# Patient Record
Sex: FEMALE | Race: WHITE | NOT HISPANIC OR LATINO | URBAN - NONMETROPOLITAN AREA
[De-identification: names, ages, dates, MRNs, and addresses within clinical notes are randomized per-mention and may not be internally consistent; named-entity substitution may affect disease eponyms.]

---

## 2023-04-25 ENCOUNTER — TELEPHONE (OUTPATIENT)
Dept: PRIMARY CARE | Facility: CLINIC | Age: 63
End: 2023-04-25
Payer: COMMERCIAL

## 2023-04-25 DIAGNOSIS — Z78.0 POSTMENOPAUSAL: ICD-10-CM

## 2023-04-25 DIAGNOSIS — Z12.31 ENCOUNTER FOR SCREENING MAMMOGRAM FOR MALIGNANT NEOPLASM OF BREAST: Primary | ICD-10-CM

## 2023-06-07 ENCOUNTER — TELEPHONE (OUTPATIENT)
Dept: PRIMARY CARE | Facility: CLINIC | Age: 63
End: 2023-06-07
Payer: COMMERCIAL

## 2023-06-07 DIAGNOSIS — N30.00 ACUTE CYSTITIS WITHOUT HEMATURIA: Primary | ICD-10-CM

## 2023-06-07 RX ORDER — CEPHALEXIN 500 MG/1
500 CAPSULE ORAL 3 TIMES DAILY
Qty: 21 CAPSULE | Refills: 0 | Status: SHIPPED | OUTPATIENT
Start: 2023-06-07 | End: 2023-06-14

## 2023-06-07 NOTE — TELEPHONE ENCOUNTER
Comprehensive Intake Entered On:  12/23/2020 11:36 AM CST    Performed On:  12/23/2020 11:34 AM CST by Geraldrenzo BRIONESAnkitaNanette               Summary   Chief Complaint :   Discuss meds- would like to go back on BCP and ADD meds- verbal consent given for phone visit   Nanette Duarte CMA - 12/23/2020 11:36 AM CST     Menstrual Status :   Menarcheal   Race :      Languages :   English   Ethnicity :   Not  or    Nanette Duarte CMA - 12/23/2020 11:34 AM CST   Health Status   Allergies Verified? :   Yes   Medication History Verified? :   Yes   Pre-Visit Planning Status :   Completed   Tobacco Use? :   Never smoker   Nanette Duarte CMA - 12/23/2020 11:34 AM CST   Consents   Consent for Immunization Exchange :   Consent Granted   Consent for Immunizations to Providers :   Consent Granted   Nanette Duarte CMA - 12/23/2020 11:34 AM CST   Meds / Allergies   (As Of: 12/23/2020 11:36:19 AM CST)   Allergies (Active)   sulfa drug  Estimated Onset Date:   Unspecified ; Comments:     Comment 1: Mom and grandma highly allergic to sulfa   ; Created By:   Berta León; Reaction Status:   Active ; Category:   Drug ; Substance:   sulfa drug ; Type:   Allergy ; Updated By:   Berta León; Reviewed Date:   8/16/2017 1:23 PM CDT        Medication List   (As Of: 12/23/2020 11:36:19 AM CST)   Prescription/Discharge Order    albuterol  :   albuterol ; Status:   Prescribed ; Ordered As Mnemonic:   ProAir HFA 90 mcg/inh inhalation aerosol ; Simple Display Line:   2 puff(s), inh, q4-6 hrs, 1 EA, 2 Refill(s) ; Ordering Provider:   Jackie Armijo MD; Catalog Code:   albuterol ; Order Dt/Tm:   9/21/2015 5:20:08 PM CDT          amphetamine-dextroamphetamine  :   amphetamine-dextroamphetamine ; Status:   Prescribed ; Ordered As Mnemonic:   Adderall XR 20 mg oral capsule, extended release ; Simple Display Line:   20 mg, 1 cap(s), PO, qAM, 30 cap(s), 0 Refill(s) ; Ordering Provider:   Katrin  UTI s/s starting a couple days ago asking if you can send something in.    April GRACE; Catalog Code:   amphetamine-dextroamphetamine ; Order Dt/Tm:   8/4/2017 5:38:18 PM CDT          ethinyl estradiol-levonorgestrel  :   ethinyl estradiol-levonorgestrel ; Status:   Processing ; Ordered As Mnemonic:   Quasense 0.15 mg-30 mcg oral tablet ; Ordering Provider:   April Fried; Action Display:   Complete ; Catalog Code:   ethinyl estradiol-levonorgestrel ; Order Dt/Tm:   12/23/2020 11:35:01 AM CST          Miscellaneous Prescription  :   Miscellaneous Prescription ; Status:   Prescribed ; Ordered As Mnemonic:   chamber spacer with mouthpiece  for use with albuterol ; Simple Display Line:   See Instructions, Use with albuterol., 1 EA ; Ordering Provider:   Macy Garcia MD; Catalog Code:   Miscellaneous Prescription ; Order Dt/Tm:   9/20/2012 9:48:17 AM CDT          naproxen  :   naproxen ; Status:   Prescribed ; Ordered As Mnemonic:   Naprosyn 500 mg oral tablet ; Simple Display Line:   500 mg, 1 tab(s), PO, BID, 60 tab(s), 0 Refill(s) ; Ordering Provider:   April Fried; Catalog Code:   naproxen ; Order Dt/Tm:   6/5/2017 6:10:10 PM CDT            ID Risk Screen   Recent Travel History :   No recent travel   Family Member Travel History :   No recent travel   Other Exposure to Infectious Disease :   Unknown   Nanette Duarte CMA - 12/23/2020 11:34 AM CST   Social History   Social History   (As Of: 12/23/2020 11:36:19 AM CST)   Alcohol:        Never   (Last Updated: 6/8/2015 4:25:16 PM CDT by Aniyah Walter MA)          Tobacco:        Never   (Last Updated: 11/20/2013 6:15:00 PM CST by Ev Odom LPN)   Never (less than 100 in lifetime)   (Last Updated: 12/23/2020 11:36:07 AM CST by Nanette Duarte CMA)          Electronic Cigarette/Vaping:        Electronic Cigarette Use: Never.   (Last Updated: 12/23/2020 11:36:11 AM CST by Nanette Duarte CMA)          Substance Abuse:        Never   (Last Updated: 6/8/2015 4:25:16 PM CDT by Aniyah Walter MA)           Employment/School:        Student   (Last Updated: 6/8/2015 4:25:16 PM CDT by Running Aniyah PARSONS)          Home/Environment:        Marital status: Single.   (Last Updated: 6/8/2015 4:25:17 PM CDT by Running Aniyah PARSONS)          Nutrition/Health:        Type of diet: Regular.   (Last Updated: 6/8/2015 4:25:17 PM CDT by Aniyah Walter MA)          Exercise:        Exercise frequency: 1-2 times/week.  Exercise type: Running.   (Last Updated: 6/8/2015 4:25:17 PM CDT by Aniyah Walter MA)          Sexual:        Sexually active: Yes.  Sexual orientation: Heterosexual.  Contraceptive Use Details: Birth control pill, Condoms.   (Last Updated: 6/8/2015 4:25:17 PM CDT by Aniyah Walter MA)

## 2023-06-08 NOTE — TELEPHONE ENCOUNTER
Called patient and sent prescription for Keflex.  Advised to call 911 or to go to the emergency room as soon as possible if develops abdominal pain/flank pain, pain is getting worse, fever, chills, nausea and vomiting.  She understood verbalized understanding.

## 2023-06-30 ENCOUNTER — APPOINTMENT (OUTPATIENT)
Dept: PRIMARY CARE | Facility: CLINIC | Age: 63
End: 2023-06-30
Payer: COMMERCIAL

## 2023-07-06 ENCOUNTER — OFFICE VISIT (OUTPATIENT)
Dept: PRIMARY CARE | Facility: CLINIC | Age: 63
End: 2023-07-06
Payer: MEDICARE

## 2023-07-06 VITALS
HEART RATE: 79 BPM | TEMPERATURE: 96.7 F | DIASTOLIC BLOOD PRESSURE: 64 MMHG | WEIGHT: 127.6 LBS | BODY MASS INDEX: 20.51 KG/M2 | HEIGHT: 66 IN | SYSTOLIC BLOOD PRESSURE: 100 MMHG | OXYGEN SATURATION: 98 %

## 2023-07-06 DIAGNOSIS — Z00.00 WELCOME TO MEDICARE PREVENTIVE VISIT: ICD-10-CM

## 2023-07-06 DIAGNOSIS — Z00.00 ROUTINE GENERAL MEDICAL EXAMINATION AT HEALTH CARE FACILITY: Primary | ICD-10-CM

## 2023-07-06 DIAGNOSIS — K64.8 OTHER HEMORRHOIDS: ICD-10-CM

## 2023-07-06 DIAGNOSIS — Z13.6 SCREENING FOR CARDIOVASCULAR CONDITION: ICD-10-CM

## 2023-07-06 DIAGNOSIS — N39.0 RECURRENT UTI: ICD-10-CM

## 2023-07-06 DIAGNOSIS — Z11.59 NEED FOR HEPATITIS C SCREENING TEST: ICD-10-CM

## 2023-07-06 DIAGNOSIS — Z86.14 HISTORY OF MRSA INFECTION: ICD-10-CM

## 2023-07-06 PROCEDURE — G0402 INITIAL PREVENTIVE EXAM: HCPCS | Performed by: INTERNAL MEDICINE

## 2023-07-06 PROCEDURE — 1036F TOBACCO NON-USER: CPT | Performed by: INTERNAL MEDICINE

## 2023-07-06 RX ORDER — MULTIVITAMIN
1 TABLET ORAL DAILY
COMMUNITY

## 2023-07-06 RX ORDER — BUTYROSPERMUM PARKII(SHEA BUTTER), SIMMONDSIA CHINENSIS (JOJOBA) SEED OIL, ALOE BARBADENSIS LEAF EXTRACT .01; 1; 3.5 G/100G; G/100G; G/100G
250 LIQUID TOPICAL 2 TIMES DAILY
COMMUNITY

## 2023-07-06 RX ORDER — PNEUMOCOCCAL 20-VALENT CONJUGATE VACCINE 2.2; 2.2; 2.2; 2.2; 2.2; 2.2; 2.2; 2.2; 2.2; 2.2; 2.2; 2.2; 2.2; 2.2; 2.2; 2.2; 4.4; 2.2; 2.2; 2.2 UG/.5ML; UG/.5ML; UG/.5ML; UG/.5ML; UG/.5ML; UG/.5ML; UG/.5ML; UG/.5ML; UG/.5ML; UG/.5ML; UG/.5ML; UG/.5ML; UG/.5ML; UG/.5ML; UG/.5ML; UG/.5ML; UG/.5ML; UG/.5ML; UG/.5ML; UG/.5ML
0.5 INJECTION, SUSPENSION INTRAMUSCULAR ONCE
Qty: 0.5 ML | Refills: 0 | Status: SHIPPED | OUTPATIENT
Start: 2023-07-06 | End: 2023-07-06

## 2023-07-06 ASSESSMENT — ENCOUNTER SYMPTOMS
UNEXPECTED WEIGHT CHANGE: 0
COUGH: 0
SINUS PAIN: 0
BRUISES/BLEEDS EASILY: 0
OCCASIONAL FEELINGS OF UNSTEADINESS: 0
ABDOMINAL PAIN: 0
DEPRESSION: 0
WHEEZING: 0
DIFFICULTY URINATING: 0
ARTHRALGIAS: 0
LOSS OF SENSATION IN FEET: 0
FATIGUE: 0
PALPITATIONS: 0
BLOOD IN STOOL: 0
ANAL BLEEDING: 1
HEADACHES: 0
FEVER: 0
SORE THROAT: 0
DIZZINESS: 0
DIARRHEA: 0

## 2023-07-06 ASSESSMENT — PATIENT HEALTH QUESTIONNAIRE - PHQ9
2. FEELING DOWN, DEPRESSED OR HOPELESS: NOT AT ALL
2. FEELING DOWN, DEPRESSED OR HOPELESS: NOT AT ALL
SUM OF ALL RESPONSES TO PHQ9 QUESTIONS 1 AND 2: 0
1. LITTLE INTEREST OR PLEASURE IN DOING THINGS: NOT AT ALL
1. LITTLE INTEREST OR PLEASURE IN DOING THINGS: NOT AT ALL
SUM OF ALL RESPONSES TO PHQ9 QUESTIONS 1 AND 2: 0

## 2023-07-06 ASSESSMENT — VISUAL ACUITY
OD_CC: 20/30
OS_CC: 20/30

## 2023-07-06 ASSESSMENT — ACTIVITIES OF DAILY LIVING (ADL)
DRESSING: INDEPENDENT
BATHING: INDEPENDENT

## 2023-07-06 NOTE — PROGRESS NOTES
Subjective   Reason for Visit: Isadora Lei is an 62 y.o. female here for a Medicare Wellness visit.     Past Medical, Surgical, and Family History reviewed and updated in chart.    Reviewed all medications by prescribing practitioner or clinical pharmacist (such as prescriptions, OTCs, herbal therapies and supplements) and documented in the medical record.    New patient comes today to establish new primary care physician also wants to have welcome to Medicare exam  Annual preventive and welcome to Medicare exam  - EKG sinus rhythm patient reassured maximize medical management  -Vaccinations reviewed and up-to-date  Needs booster dose of Prevnar 20 new prescription provided today  -Needs a screening for hepatitis C  -Screening for breast cancer mammogram obtained in April 2023 negative  - Screening for osteoporosis bone densitometry April 2023 negative continue with calcium and vitamin D  - Screening for colon cancer Cologuard negative repeat in April 2026  -Recent blood work from previous PCP reviewed up-to-date within normal limits    - History of stage III hemorrhoids not improving with the local treatment patient need to schedule surgical appointment for further eval and treatment  -Last GYN exam and Pap smear many years ago need to follow-up GYN for Pap smear  -History of left knee MRSA skin infection resolved with no recurrence  - Underlying history of recurrent UTI patient counseled about prevention urine analysis and culture if needed  -Follow-up results closely otherwise we will reevaluate patient in 6 months        Patient Care Team:  Ann Marie Li MD as PCP - General (Internal Medicine)  Esa Fernando MD as PCP - [facility] PCP     Review of Systems   Constitutional:  Negative for fatigue, fever and unexpected weight change.   HENT:  Negative for congestion, ear discharge, ear pain, mouth sores, sinus pain and sore throat.    Eyes:  Negative for visual disturbance.   Respiratory:   "Negative for cough and wheezing.    Cardiovascular:  Negative for chest pain, palpitations and leg swelling.   Gastrointestinal:  Positive for anal bleeding. Negative for abdominal pain, blood in stool and diarrhea.   Genitourinary:  Negative for difficulty urinating.   Musculoskeletal:  Negative for arthralgias.   Skin:  Negative for rash.   Neurological:  Negative for dizziness and headaches.   Hematological:  Does not bruise/bleed easily.   Psychiatric/Behavioral:  Negative for behavioral problems.    All other systems reviewed and are negative.      Objective   Vitals:  /64   Pulse 79   Temp 35.9 °C (96.7 °F)   Ht 1.683 m (5' 6.25\")   Wt 57.9 kg (127 lb 9.6 oz)   SpO2 98%   BMI 20.44 kg/m²       Physical Exam  Vitals and nursing note reviewed.   Constitutional:       Appearance: Normal appearance.   HENT:      Head: Normocephalic.      Right Ear: There is no impacted cerumen.      Left Ear: There is no impacted cerumen.      Nose: Nose normal.   Eyes:      Conjunctiva/sclera: Conjunctivae normal.      Pupils: Pupils are equal, round, and reactive to light.   Neck:      Vascular: No carotid bruit.   Cardiovascular:      Rate and Rhythm: Regular rhythm.   Pulmonary:      Effort: Pulmonary effort is normal.      Breath sounds: Normal breath sounds.   Abdominal:      General: Abdomen is flat.      Palpations: Abdomen is soft.   Musculoskeletal:      Cervical back: Neck supple. No rigidity or tenderness.   Lymphadenopathy:      Cervical: No cervical adenopathy.   Skin:     General: Skin is warm.   Neurological:      General: No focal deficit present.      Mental Status: She is oriented to person, place, and time.   Psychiatric:         Mood and Affect: Mood normal.         Assessment/Plan   Problem List Items Addressed This Visit       Other hemorrhoids    Relevant Orders    CBC and Auto Differential    Urinalysis with Reflex Microscopic and Culture    Recurrent UTI    Relevant Orders    CBC and Auto " Differential    Urinalysis with Reflex Microscopic and Culture    History of MRSA infection     Other Visit Diagnoses       Routine general medical examination at health care facility    -  Primary    Welcome to Medicare preventive visit        Relevant Medications    pneumoc 20-nalini conj-dip cr,PF, (Prevnar 20, PF,) 0.5 mL vaccine    Other Relevant Orders    CBC and Auto Differential    Comprehensive Metabolic Panel    Lipid Panel    TSH with reflex to Free T4 if abnormal    Screening for cardiovascular condition        Relevant Orders    ECG 12 lead (Completed)    Need for hepatitis C screening test        Relevant Orders    Hepatitis C antibody        New patient comes today to establish new primary care physician also wants to have welcome to Medicare exam  Annual preventive and welcome to Medicare exam  - EKG sinus rhythm patient reassured maximize medical management  -Vaccinations reviewed and up-to-date  Needs booster dose of Prevnar 20 new prescription provided today  -Needs a screening for hepatitis C  -Screening for breast cancer mammogram obtained in April 2023 negative  - Screening for osteoporosis bone densitometry April 2023 negative continue with calcium and vitamin D  - Screening for colon cancer Cologuard negative repeat in April 2026  -Recent blood work from previous PCP reviewed up-to-date within normal limits    - History of stage III hemorrhoids not improving with the local treatment patient need to schedule surgical appointment for further eval and treatment  -Last GYN exam and Pap smear many years ago need to follow-up GYN for Pap smear  -History of left knee MRSA skin infection resolved with no recurrence  - Underlying history of recurrent UTI patient counseled about prevention urine analysis and culture if needed  -Follow-up results closely otherwise we will reevaluate patient in 6 months

## 2023-10-19 PROBLEM — R21 RASH: Status: ACTIVE | Noted: 2023-10-19

## 2023-10-19 PROBLEM — N94.9 VAGINAL BURNING: Status: ACTIVE | Noted: 2023-10-19

## 2023-10-19 PROBLEM — B35.1 ONYCHOMYCOSIS OF TOENAIL: Status: ACTIVE | Noted: 2023-10-19

## 2023-10-19 PROBLEM — M25.551 HIP PAIN, RIGHT: Status: ACTIVE | Noted: 2023-10-19

## 2023-10-19 PROBLEM — L98.9 CHANGING SKIN LESION: Status: ACTIVE | Noted: 2023-10-19

## 2023-10-19 PROBLEM — K64.2 GRADE III HEMORRHOIDS: Status: ACTIVE | Noted: 2023-10-19

## 2023-10-19 PROBLEM — K62.89 RECTAL PAIN, CHRONIC: Status: ACTIVE | Noted: 2023-10-19

## 2023-10-19 PROBLEM — N95.2 ATROPHIC VAGINITIS: Status: ACTIVE | Noted: 2023-10-19

## 2023-10-19 PROBLEM — R53.83 FATIGUE: Status: ACTIVE | Noted: 2023-10-19

## 2023-10-19 PROBLEM — G89.29 RECTAL PAIN, CHRONIC: Status: ACTIVE | Noted: 2023-10-19

## 2023-10-19 PROBLEM — K64.8 INTERNAL HEMORRHOID: Status: ACTIVE | Noted: 2023-10-19

## 2023-10-19 PROBLEM — M51.369 DDD (DEGENERATIVE DISC DISEASE), LUMBAR: Status: ACTIVE | Noted: 2023-10-19

## 2023-10-19 PROBLEM — R23.8 CHANGE OF SKIN COLOR: Status: ACTIVE | Noted: 2023-10-19

## 2023-10-19 PROBLEM — N94.89 VAGINAL BURNING: Status: ACTIVE | Noted: 2023-10-19

## 2023-10-19 PROBLEM — K60.2 ANAL FISSURE: Status: ACTIVE | Noted: 2023-10-19

## 2023-10-19 PROBLEM — M51.36 DDD (DEGENERATIVE DISC DISEASE), LUMBAR: Status: ACTIVE | Noted: 2023-10-19

## 2023-10-19 PROBLEM — I10 BENIGN ESSENTIAL HTN: Status: ACTIVE | Noted: 2023-10-19

## 2023-10-19 RX ORDER — CLOTRIMAZOLE 1 %
1 CREAM (GRAM) TOPICAL 2 TIMES DAILY
COMMUNITY
End: 2023-10-20 | Stop reason: WASHOUT

## 2023-10-19 RX ORDER — MONTELUKAST SODIUM 4 MG/1
1 TABLET, CHEWABLE ORAL 2 TIMES DAILY
COMMUNITY
Start: 2023-08-28 | End: 2023-11-06 | Stop reason: SDUPTHER

## 2023-10-19 RX ORDER — NYSTATIN 100000 U/G
1 OINTMENT TOPICAL 2 TIMES DAILY
COMMUNITY
Start: 2022-11-18

## 2023-10-19 RX ORDER — ESTRADIOL 4 UG/1
INSERT VAGINAL
COMMUNITY
Start: 2019-03-06

## 2023-10-20 ENCOUNTER — OFFICE VISIT (OUTPATIENT)
Dept: SURGERY | Facility: CLINIC | Age: 63
End: 2023-10-20
Payer: MEDICARE

## 2023-10-20 VITALS
SYSTOLIC BLOOD PRESSURE: 114 MMHG | RESPIRATION RATE: 17 BRPM | DIASTOLIC BLOOD PRESSURE: 71 MMHG | HEIGHT: 66 IN | WEIGHT: 128.2 LBS | HEART RATE: 74 BPM | BODY MASS INDEX: 20.6 KG/M2

## 2023-10-20 DIAGNOSIS — L29.0 PRURITUS ANI: ICD-10-CM

## 2023-10-20 DIAGNOSIS — M62.838 LEVATOR SPASM: Primary | ICD-10-CM

## 2023-10-20 DIAGNOSIS — K64.1 GRADE II INTERNAL HEMORRHOIDS: ICD-10-CM

## 2023-10-20 PROCEDURE — 99204 OFFICE O/P NEW MOD 45 MIN: CPT | Performed by: SURGERY

## 2023-10-20 PROCEDURE — 3078F DIAST BP <80 MM HG: CPT | Performed by: SURGERY

## 2023-10-20 PROCEDURE — 1159F MED LIST DOCD IN RCRD: CPT | Performed by: SURGERY

## 2023-10-20 PROCEDURE — 46600 DIAGNOSTIC ANOSCOPY SPX: CPT | Performed by: SURGERY

## 2023-10-20 PROCEDURE — 1160F RVW MEDS BY RX/DR IN RCRD: CPT | Performed by: SURGERY

## 2023-10-20 PROCEDURE — 1036F TOBACCO NON-USER: CPT | Performed by: SURGERY

## 2023-10-20 PROCEDURE — 3074F SYST BP LT 130 MM HG: CPT | Performed by: SURGERY

## 2023-10-20 PROCEDURE — 99214 OFFICE O/P EST MOD 30 MIN: CPT | Mod: 25 | Performed by: SURGERY

## 2023-10-20 PROCEDURE — 1125F AMNT PAIN NOTED PAIN PRSNT: CPT | Performed by: SURGERY

## 2023-10-20 ASSESSMENT — LIFESTYLE VARIABLES
HOW OFTEN DO YOU HAVE A DRINK CONTAINING ALCOHOL: NEVER
AUDIT-C TOTAL SCORE: 0
HOW MANY STANDARD DRINKS CONTAINING ALCOHOL DO YOU HAVE ON A TYPICAL DAY: PATIENT DOES NOT DRINK
HOW OFTEN DO YOU HAVE SIX OR MORE DRINKS ON ONE OCCASION: NEVER
SKIP TO QUESTIONS 9-10: 1

## 2023-10-20 ASSESSMENT — PATIENT HEALTH QUESTIONNAIRE - PHQ9
SUM OF ALL RESPONSES TO PHQ9 QUESTIONS 1 & 2: 0
2. FEELING DOWN, DEPRESSED OR HOPELESS: NOT AT ALL
1. LITTLE INTEREST OR PLEASURE IN DOING THINGS: NOT AT ALL

## 2023-10-20 ASSESSMENT — ENCOUNTER SYMPTOMS
LOSS OF SENSATION IN FEET: 0
DEPRESSION: 0
OCCASIONAL FEELINGS OF UNSTEADINESS: 0

## 2023-10-20 ASSESSMENT — PAIN SCALES - GENERAL: PAINLEVEL: 4

## 2023-10-20 NOTE — PROGRESS NOTES
[facility]: GENERAL SURGERY  PROGRESS NOTE      Isadora Lei is a 63 y.o. female that is presenting today for New Patient Visit (Dr. Ureña ref for hemorrhoids).    ASSESSMENT / PLAN:  Diagnoses and all orders for this visit:  Levator spasm  Grade II internal hemorrhoids  Pruritus ani  I think banding would not currently be beneficial as the patient's symptoms do not appear to be solely hemorrhoidal.  Discussed Kegel exercises and stretching for levator spasm as first-line, may benefit from referral to pelvic floor physiotherapy should symptoms persist.  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  May benefit from GI referral for colonoscopy with random biopsies/SIBO testing/food allergy testing should symptoms persist    Patient Active Problem List   Diagnosis    Other hemorrhoids    Recurrent UTI    History of MRSA infection    Anal fissure    Atrophic vaginitis    Benign essential HTN    Change of skin color    Changing skin lesion    DDD (degenerative disc disease), lumbar    Fatigue    Grade III hemorrhoids    Hip pain, right    Onychomycosis of toenail    Rash    Rectal pain, chronic    Vaginal burning    Internal hemorrhoid     Subjective   12 yrs of symptoms, pain and fullness not only with bowel movements.  Occasional perianal burning.  She uses Calmoseptine which occasionally improves her symptoms but can also burn.  No sharp pain with defecation.  BM only 4 per day with colestipol but have been up to 12 times per day.  Believes she gets adequate fiber in her diet and is not on a current fiber supplement. c scope within 10 years but does not believe she had random biopsies symptoms improved with walking, has had 6 sessions of IRC reported internal hemorrhoids without any improvement in symptoms. no bleeding with bowel movements, no fecal incontinence  Review of  Systems   All other systems reviewed and are negative.     Objective   Vitals:    10/20/23 1047   BP: 114/71   Pulse: 74   Resp: 17      Physical Exam  Constitutional:       Appearance: Normal appearance.   HENT:      Head: Normocephalic.   Cardiovascular:      Rate and Rhythm: Normal rate and regular rhythm.      Pulses: Normal pulses.   Pulmonary:      Effort: Pulmonary effort is normal.   Abdominal:      General: Bowel sounds are normal.      Palpations: Abdomen is soft.   Genitourinary:     Comments: The patient was placed in prone Kraske position. Perianal skin was examined without abnormality.  No excoriation or fissure seen.  No external hemorrhoids identified.  Digital rectal exam revealed diminished tone with present but slightly poor squeeze.  No palpable masses appreciated.  There is acute tenderness palpation of the right levator muscle anoscope was inserted with 2 internal hemorrhoids in the 3 cardinal locations  Musculoskeletal:         General: Normal range of motion.   Skin:     General: Skin is warm.   Neurological:      General: No focal deficit present.      Mental Status: She is alert.   Psychiatric:         Mood and Affect: Mood normal.         Behavior: Behavior normal.         Diagnostic Results   Lab Results   Component Value Date    GLUCOSE 83 11/03/2022    CALCIUM 9.0 11/03/2022     11/03/2022    K 3.9 11/03/2022    CO2 30 11/03/2022     11/03/2022    BUN 19 11/03/2022    CREATININE 0.82 11/03/2022     Lab Results   Component Value Date    ALT 15 11/03/2022    AST 21 11/03/2022    ALKPHOS 61 11/03/2022    BILITOT 1.2 11/03/2022     Lab Results   Component Value Date    WBC 7.2 11/03/2022    HGB 13.8 11/03/2022    HCT 41.3 11/03/2022    MCV 96 11/03/2022     11/03/2022     Lab Results   Component Value Date    CHOL 163 11/03/2022    CHOL 157 11/11/2021    CHOL 174 11/11/2020     Lab Results   Component Value Date    HDL 53.7 11/03/2022    HDL 54.4 11/11/2021    HDL 57.3  "11/11/2020     No results found for: \"LDLCALC\"  Lab Results   Component Value Date    TRIG 122 11/03/2022    TRIG 92 11/11/2021    TRIG 106 11/11/2020     No components found for: \"CHOLHDL\"  No results found for: \"HGBA1C\"  Other labs not included in the list above were reviewed either before or during this encounter.    History    Past Medical History:   Diagnosis Date    Encounter for general adult medical examination without abnormal findings     Encounter for preventive health examination    Fissure, anal     PONV (postoperative nausea and vomiting)      Past Surgical History:   Procedure Laterality Date    ADENOIDECTOMY      CHOLECYSTECTOMY      OTHER SURGICAL HISTORY  07/02/2020    Tonsillectomy    OTHER SURGICAL HISTORY  07/02/2020    Gallbladder surgery    RHINOPLASTY      VEIN LIGATION AND STRIPPING       Family History   Problem Relation Name Age of Onset    Heart disease Mother Doris     Other (cardiac disease) Mother Doris     Arthritis Mother Doris     COPD Mother Doris     Hearing loss Mother Doris     Hypertension Mother Doris     Miscarriages / Stillbirths Mother Doris     Stroke Mother Doris     Vision loss Mother Doris     Diabetes Father Regan     Heart disease Father Regan     Skin cancer Father Regan     Kidney failure Father Regan     Arthritis Father Rgean     Asthma Father Regan     Cancer Father Regan     Hearing loss Father Regan     Hyperlipidemia Father Regan     Hypertension Father Regan     Kidney disease Father Regan     Stroke Father Regan     Vision loss Father Regan      Allergies   Allergen Reactions    Fluconazole Swelling     Swelling of the tongue     Current Outpatient Medications on File Prior to Visit   Medication Sig Dispense Refill    colestipol (Colestid) 1 gram tablet Take 1 tablet (1 g) by mouth 2 times a day.      multivitamin tablet Take 1 tablet by mouth once daily.      nystatin (Mycostatin) ointment Apply 1 Application topically 2 times a day.  APPLY SPARINGLY TO AFFECTED AREA(S) 2 TIMES DAILY.      " saccharomyces boulardii (Florastor) 250 mg capsule Take 1 capsule (250 mg) by mouth 2 times a day.      clotrimazole (Lotrimin AF, clotrimazole,) 1 % cream Apply 1 Application topically 2 times a day.      estradioL (Imvexxy Maintenance Pack) 4 mcg insert 1 _insert Vaginal Two times a Week--maintanence pack       No current facility-administered medications on file prior to visit.     Immunization History   Administered Date(s) Administered    Flu vaccine (IIV4), preservative free *Check age/dose* 10/21/2017, 10/14/2018, 09/28/2019, 10/10/2020, 10/15/2021, 10/14/2022    Influenza, Seasonal, Quadrivalent, Adjuvanted 08/06/2021, 08/16/2021    Influenza, Unspecified 09/27/2015, 10/22/2017, 09/06/2018    Moderna COVID-19 vaccine, bivalent, blue cap/gray label *Check age/dose* 10/17/2022    Novel influenza-H1N1-09, preservative-free 12/14/2009    Pfizer COVID-19 vaccine, Fall 2023, 12 years and older, (30mcg/0.3mL) 10/08/2023    Pfizer Gray Cap SARS-CoV-2 04/13/2022    Pfizer Purple Cap SARS-CoV-2 03/11/2021, 04/01/2021, 11/15/2021, 09/12/2023    Pneumococcal conjugate vaccine, 20-valent (PREVNAR 20) 07/07/2023    RSV, Bivalent, 0.5 mL (ABRYSVO) 07/24/2023, 10/08/2023    Td (adult), unspecified 08/08/2003    Tdap vaccine, age 7 year and older (BOOSTRIX) 10/31/2022    Zoster vaccine, recombinant, adult (SHINGRIX) 09/06/2018, 11/08/2018     Patient's medical history was reviewed and updated either before or during this encounter.    Mazin Judge MD

## 2023-11-05 ENCOUNTER — PATIENT MESSAGE (OUTPATIENT)
Dept: GASTROENTEROLOGY | Facility: CLINIC | Age: 63
End: 2023-11-05
Payer: MEDICARE

## 2023-11-05 DIAGNOSIS — R19.7 DIARRHEA, UNSPECIFIED TYPE: Primary | ICD-10-CM

## 2023-11-06 RX ORDER — MONTELUKAST SODIUM 4 MG/1
1 TABLET, CHEWABLE ORAL 2 TIMES DAILY
Qty: 180 TABLET | Refills: 1 | Status: SHIPPED | OUTPATIENT
Start: 2023-11-06 | End: 2024-05-04

## 2024-05-17 ENCOUNTER — LAB (OUTPATIENT)
Dept: LAB | Facility: LAB | Age: 64
End: 2024-05-17
Payer: MEDICARE

## 2024-05-17 DIAGNOSIS — K64.8 OTHER HEMORRHOIDS: ICD-10-CM

## 2024-05-17 DIAGNOSIS — Z11.59 NEED FOR HEPATITIS C SCREENING TEST: ICD-10-CM

## 2024-05-17 DIAGNOSIS — N39.0 RECURRENT UTI: ICD-10-CM

## 2024-05-17 DIAGNOSIS — Z00.00 WELCOME TO MEDICARE PREVENTIVE VISIT: ICD-10-CM

## 2024-05-17 LAB
ALBUMIN SERPL BCP-MCNC: 4.2 G/DL (ref 3.4–5)
ALP SERPL-CCNC: 63 U/L (ref 33–136)
ALT SERPL W P-5'-P-CCNC: 13 U/L (ref 7–45)
ANION GAP SERPL CALC-SCNC: 10 MMOL/L (ref 10–20)
APPEARANCE UR: CLEAR
AST SERPL W P-5'-P-CCNC: 22 U/L (ref 9–39)
BASOPHILS # BLD AUTO: 0.07 X10*3/UL (ref 0–0.1)
BASOPHILS NFR BLD AUTO: 0.9 %
BILIRUB SERPL-MCNC: 0.8 MG/DL (ref 0–1.2)
BILIRUB UR STRIP.AUTO-MCNC: NEGATIVE MG/DL
BUN SERPL-MCNC: 17 MG/DL (ref 6–23)
CALCIUM SERPL-MCNC: 9.6 MG/DL (ref 8.6–10.3)
CHLORIDE SERPL-SCNC: 103 MMOL/L (ref 98–107)
CO2 SERPL-SCNC: 30 MMOL/L (ref 21–32)
COLOR UR: NORMAL
CREAT SERPL-MCNC: 0.82 MG/DL (ref 0.5–1.05)
EGFRCR SERPLBLD CKD-EPI 2021: 79 ML/MIN/1.73M*2
EOSINOPHIL # BLD AUTO: 0.38 X10*3/UL (ref 0–0.7)
EOSINOPHIL NFR BLD AUTO: 5 %
ERYTHROCYTE [DISTWIDTH] IN BLOOD BY AUTOMATED COUNT: 12.3 % (ref 11.5–14.5)
GLUCOSE SERPL-MCNC: 106 MG/DL (ref 74–99)
GLUCOSE UR STRIP.AUTO-MCNC: NORMAL MG/DL
HCT VFR BLD AUTO: 42.2 % (ref 36–46)
HCV AB SER QL: NONREACTIVE
HGB BLD-MCNC: 14 G/DL (ref 12–16)
HOLD SPECIMEN: NORMAL
IMM GRANULOCYTES # BLD AUTO: 0.01 X10*3/UL (ref 0–0.7)
IMM GRANULOCYTES NFR BLD AUTO: 0.1 % (ref 0–0.9)
KETONES UR STRIP.AUTO-MCNC: NEGATIVE MG/DL
LEUKOCYTE ESTERASE UR QL STRIP.AUTO: NEGATIVE
LYMPHOCYTES # BLD AUTO: 3.08 X10*3/UL (ref 1.2–4.8)
LYMPHOCYTES NFR BLD AUTO: 40.6 %
MCH RBC QN AUTO: 31.9 PG (ref 26–34)
MCHC RBC AUTO-ENTMCNC: 33.2 G/DL (ref 32–36)
MCV RBC AUTO: 96 FL (ref 80–100)
MONOCYTES # BLD AUTO: 0.69 X10*3/UL (ref 0.1–1)
MONOCYTES NFR BLD AUTO: 9.1 %
NEUTROPHILS # BLD AUTO: 3.36 X10*3/UL (ref 1.2–7.7)
NEUTROPHILS NFR BLD AUTO: 44.3 %
NITRITE UR QL STRIP.AUTO: NEGATIVE
NRBC BLD-RTO: 0 /100 WBCS (ref 0–0)
PH UR STRIP.AUTO: 7.5 [PH]
PLATELET # BLD AUTO: 205 X10*3/UL (ref 150–450)
POTASSIUM SERPL-SCNC: 4.4 MMOL/L (ref 3.5–5.3)
PROT SERPL-MCNC: 7.5 G/DL (ref 6.4–8.2)
PROT UR STRIP.AUTO-MCNC: NEGATIVE MG/DL
RBC # BLD AUTO: 4.39 X10*6/UL (ref 4–5.2)
RBC # UR STRIP.AUTO: NEGATIVE /UL
SODIUM SERPL-SCNC: 139 MMOL/L (ref 136–145)
SP GR UR STRIP.AUTO: 1.01
TSH SERPL-ACNC: 2.91 MIU/L (ref 0.44–3.98)
UROBILINOGEN UR STRIP.AUTO-MCNC: NORMAL MG/DL
WBC # BLD AUTO: 7.6 X10*3/UL (ref 4.4–11.3)

## 2024-05-17 PROCEDURE — 36415 COLL VENOUS BLD VENIPUNCTURE: CPT

## 2024-05-17 PROCEDURE — 86803 HEPATITIS C AB TEST: CPT

## 2024-07-08 ENCOUNTER — APPOINTMENT (OUTPATIENT)
Dept: PRIMARY CARE | Facility: CLINIC | Age: 64
End: 2024-07-08
Payer: MEDICARE

## 2024-07-08 VITALS
BODY MASS INDEX: 20.94 KG/M2 | DIASTOLIC BLOOD PRESSURE: 76 MMHG | SYSTOLIC BLOOD PRESSURE: 112 MMHG | WEIGHT: 133.4 LBS | OXYGEN SATURATION: 98 % | HEIGHT: 67 IN | TEMPERATURE: 97.1 F | HEART RATE: 76 BPM

## 2024-07-08 DIAGNOSIS — R21 RASH: ICD-10-CM

## 2024-07-08 DIAGNOSIS — Z00.00 ROUTINE GENERAL MEDICAL EXAMINATION AT HEALTH CARE FACILITY: Primary | ICD-10-CM

## 2024-07-08 DIAGNOSIS — K64.8 OTHER HEMORRHOIDS: ICD-10-CM

## 2024-07-08 DIAGNOSIS — Z12.31 ENCOUNTER FOR SCREENING MAMMOGRAM FOR MALIGNANT NEOPLASM OF BREAST: ICD-10-CM

## 2024-07-08 DIAGNOSIS — K58.0 IRRITABLE BOWEL SYNDROME WITH DIARRHEA: ICD-10-CM

## 2024-07-08 DIAGNOSIS — E46 PROTEIN-CALORIE MALNUTRITION, UNSPECIFIED SEVERITY (MULTI): ICD-10-CM

## 2024-07-08 DIAGNOSIS — Z79.899 HIGH RISK MEDICATION USE: ICD-10-CM

## 2024-07-08 PROCEDURE — 1159F MED LIST DOCD IN RCRD: CPT | Performed by: INTERNAL MEDICINE

## 2024-07-08 PROCEDURE — 99213 OFFICE O/P EST LOW 20 MIN: CPT | Performed by: INTERNAL MEDICINE

## 2024-07-08 PROCEDURE — 1158F ADVNC CARE PLAN TLK DOCD: CPT | Performed by: INTERNAL MEDICINE

## 2024-07-08 PROCEDURE — 3074F SYST BP LT 130 MM HG: CPT | Performed by: INTERNAL MEDICINE

## 2024-07-08 PROCEDURE — 1160F RVW MEDS BY RX/DR IN RCRD: CPT | Performed by: INTERNAL MEDICINE

## 2024-07-08 PROCEDURE — 1170F FXNL STATUS ASSESSED: CPT | Performed by: INTERNAL MEDICINE

## 2024-07-08 PROCEDURE — G0439 PPPS, SUBSEQ VISIT: HCPCS | Performed by: INTERNAL MEDICINE

## 2024-07-08 PROCEDURE — 1123F ACP DISCUSS/DSCN MKR DOCD: CPT | Performed by: INTERNAL MEDICINE

## 2024-07-08 PROCEDURE — 3078F DIAST BP <80 MM HG: CPT | Performed by: INTERNAL MEDICINE

## 2024-07-08 PROCEDURE — 1036F TOBACCO NON-USER: CPT | Performed by: INTERNAL MEDICINE

## 2024-07-08 RX ORDER — PREDNISONE 20 MG/1
20 TABLET ORAL DAILY
Qty: 7 TABLET | Refills: 0 | Status: SHIPPED | OUTPATIENT
Start: 2024-07-08 | End: 2024-07-15

## 2024-07-08 RX ORDER — NYSTATIN 100000 U/G
1 OINTMENT TOPICAL 2 TIMES DAILY
Qty: 30 G | Refills: 0 | Status: SHIPPED | OUTPATIENT
Start: 2024-07-08

## 2024-07-08 ASSESSMENT — ENCOUNTER SYMPTOMS
SINUS PAIN: 0
DIFFICULTY URINATING: 0
PALPITATIONS: 0
ARTHRALGIAS: 0
HEADACHES: 0
BRUISES/BLEEDS EASILY: 0
UNEXPECTED WEIGHT CHANGE: 0
FATIGUE: 0
FEVER: 0
COUGH: 0
SORE THROAT: 0
BLOOD IN STOOL: 1
DIZZINESS: 0
ABDOMINAL PAIN: 0
DIARRHEA: 1
WHEEZING: 0

## 2024-07-08 ASSESSMENT — ACTIVITIES OF DAILY LIVING (ADL)
GROCERY_SHOPPING: INDEPENDENT
DRESSING: INDEPENDENT
TAKING_MEDICATION: INDEPENDENT
MANAGING_FINANCES: INDEPENDENT
DOING_HOUSEWORK: INDEPENDENT
BATHING: INDEPENDENT

## 2024-07-08 ASSESSMENT — PATIENT HEALTH QUESTIONNAIRE - PHQ9
2. FEELING DOWN, DEPRESSED OR HOPELESS: MORE THAN HALF THE DAYS
1. LITTLE INTEREST OR PLEASURE IN DOING THINGS: NEARLY EVERY DAY
SUM OF ALL RESPONSES TO PHQ9 QUESTIONS 1 AND 2: 5

## 2024-07-08 NOTE — PROGRESS NOTES
Subjective   Reason for Visit: Isadora Lei is an 63 y.o. female here for a Medicare Wellness visit.     Past Medical, Surgical, and Family History reviewed and updated in chart.    Reviewed all medications by prescribing practitioner or clinical pharmacist (such as prescriptions, OTCs, herbal therapies and supplements) and documented in the medical record.    Annual preventive and Medicare physical  -Vaccinations reviewed and up-to-date  - Patient colonoscopy reviewed obtained 6 years ago no significant findings  -With HCC screening negative recent blood work reviewed and up-to-date  Needs to continue low-fat diet  --Screening for breast cancer mammogram needs to schedule order placed today  - Screening for osteoporosis bone densitometry April 2023 negative continue with calcium and vitamin D  - Screening for colon cancer Cologuard negative repeat in April 2026 continue with calcium and vitamin D exercise  -    Follow-up  -Inguinal dermatitis patient given prescription for nystatin cream  -Irritable bowel syndrome continue with high-fiber diet seen by gastroenterology without improvement awaiting follow-up general surgery repeat colonoscopy  - History of stage III hemorrhoids not improving patient self-referred to general surgery at [facility] for surgical procedure we will follow-up closely as needed  -Last GYN exam and Pap smear many years ago need to follow-up GYN for Pap smear  -History of left knee MRSA skin infection resolved with no recurrence  - Underlying history of recurrent UTI patient counseled about prevention urine analysis and culture if needed  Follow-up 1 year        Patient Care Team:  Ann Marie Li MD as PCP - General (Internal Medicine)  Ann Marie Li [facility] PCP [facility]     Review of Systems   Constitutional:  Negative for fatigue, fever and unexpected weight change.   HENT:  Negative for congestion, ear discharge, ear pain, mouth sores, sinus pain  "and sore throat.    Eyes:  Negative for visual disturbance.   Respiratory:  Negative for cough and wheezing.    Cardiovascular:  Negative for chest pain, palpitations and leg swelling.   Gastrointestinal:  Positive for blood in stool and diarrhea. Negative for abdominal pain.   Genitourinary:  Negative for difficulty urinating.   Musculoskeletal:  Negative for arthralgias.   Skin:  Positive for rash.   Neurological:  Negative for dizziness and headaches.   Hematological:  Does not bruise/bleed easily.   Psychiatric/Behavioral:  Negative for behavioral problems.    All other systems reviewed and are negative.      Objective   Vitals:  /76   Pulse 76   Temp 36.2 °C (97.1 °F)   Ht 1.702 m (5' 7\")   Wt 60.5 kg (133 lb 6.4 oz)   SpO2 98%   BMI 20.89 kg/m²     Lab Results   Component Value Date    WBC 7.6 05/17/2024    HGB 14.0 05/17/2024    HCT 42.2 05/17/2024     05/17/2024    CHOL 163 11/03/2022    TRIG 122 11/03/2022    HDL 53.7 11/03/2022    ALT 13 05/17/2024    AST 22 05/17/2024     05/17/2024    K 4.4 05/17/2024     05/17/2024    CREATININE 0.82 05/17/2024    BUN 17 05/17/2024    CO2 30 05/17/2024    TSH 2.91 05/17/2024     par   Physical Exam  Vitals and nursing note reviewed.   Constitutional:       Appearance: Normal appearance.   HENT:      Head: Normocephalic.      Nose: Nose normal.   Eyes:      Conjunctiva/sclera: Conjunctivae normal.      Pupils: Pupils are equal, round, and reactive to light.   Cardiovascular:      Rate and Rhythm: Regular rhythm.   Pulmonary:      Effort: Pulmonary effort is normal.      Breath sounds: Normal breath sounds.   Abdominal:      General: Abdomen is flat.      Palpations: Abdomen is soft.   Musculoskeletal:      Cervical back: Neck supple.   Skin:     General: Skin is warm.      Findings: Rash present.   Neurological:      General: No focal deficit present.      Mental Status: She is oriented to person, place, and time.   Psychiatric:         " Mood and Affect: Mood normal.         Assessment/Plan   Problem List Items Addressed This Visit       Other hemorrhoids    Rash    Relevant Medications    nystatin (Mycostatin) ointment    predniSONE (Deltasone) 20 mg tablet    Protein-calorie malnutrition, unspecified severity (Multi)     Other Visit Diagnoses       Routine general medical examination at health care facility    -  Primary    Encounter for screening mammogram for malignant neoplasm of breast        Relevant Orders    BI mammo bilateral screening tomosynthesis    High risk medication use        Irritable bowel syndrome with diarrhea            Annual preventive and Medicare physical  -Vaccinations reviewed and up-to-date  - Patient colonoscopy reviewed obtained 6 years ago no significant findings  -With HCC screening negative recent blood work reviewed and up-to-date  Needs to continue low-fat diet  --Screening for breast cancer mammogram needs to schedule order placed today  - Screening for osteoporosis bone densitometry April 2023 negative continue with calcium and vitamin D  - Screening for colon cancer Cologuard negative repeat in April 2026 continue with calcium and vitamin D exercise  -    Follow-up  -Inguinal dermatitis patient given prescription for nystatin cream  - Upper extremity dermatitis continue prednisone for 7 days  -Irritable bowel syndrome continue with high-fiber diet seen by gastroenterology without improvement awaiting follow-up general surgery repeat colonoscopy  - History of stage III hemorrhoids not improving patient self-referred to general surgery at [facility] for surgical procedure we will follow-up closely as needed  -Last GYN exam and Pap smear many years ago need to follow-up GYN for Pap smear  -History of left knee MRSA skin infection resolved with no recurrence  - Underlying history of recurrent UTI patient counseled about prevention urine analysis and culture if needed  Follow-up 1 year

## 2024-07-08 NOTE — PROGRESS NOTES
"Subjective   Patient ID: Isadora Lei is a 63 y.o. female who presents for Medicare Annual Wellness Visit Subsequent, Results (BW), Rash (Gets a rash on arms at least once or twice a year, given prednisone 20 mg in the past would like in RX to have on hand), Basal Cell Carcinoma (Having Mohs surgery), and Hemorrhoids (Having surgery).    HPI       Review of Systems    Objective   No results found for: \"HGBA1C\"   /76   Pulse 76   Temp 36.2 °C (97.1 °F)   Ht 1.702 m (5' 7\")   Wt 60.5 kg (133 lb 6.4 oz)   SpO2 98%   BMI 20.89 kg/m²     Physical Exam    Assessment/Plan   Isadora was seen today for medicare annual wellness visit subsequent, results, rash, basal cell carcinoma and hemorrhoids.  Diagnoses and all orders for this visit:  Rash     "

## 2024-07-24 ENCOUNTER — LAB (OUTPATIENT)
Dept: LAB | Facility: LAB | Age: 64
End: 2024-07-24
Payer: MEDICARE

## 2024-07-24 ENCOUNTER — OFFICE VISIT (OUTPATIENT)
Dept: PRIMARY CARE | Facility: CLINIC | Age: 64
End: 2024-07-24
Payer: MEDICARE

## 2024-07-24 ENCOUNTER — TELEPHONE (OUTPATIENT)
Dept: PRIMARY CARE | Facility: CLINIC | Age: 64
End: 2024-07-24

## 2024-07-24 DIAGNOSIS — R39.9 URINARY SYMPTOM OR SIGN: ICD-10-CM

## 2024-07-24 LAB
APPEARANCE UR: CLEAR
BILIRUB UR STRIP.AUTO-MCNC: NEGATIVE MG/DL
COLOR UR: NORMAL
GLUCOSE UR STRIP.AUTO-MCNC: NORMAL MG/DL
HOLD SPECIMEN: NORMAL
KETONES UR STRIP.AUTO-MCNC: NEGATIVE MG/DL
LEUKOCYTE ESTERASE UR QL STRIP.AUTO: NEGATIVE
NITRITE UR QL STRIP.AUTO: NEGATIVE
PH UR STRIP.AUTO: 6.5 [PH]
PROT UR STRIP.AUTO-MCNC: NEGATIVE MG/DL
RBC # UR STRIP.AUTO: NEGATIVE /UL
SP GR UR STRIP.AUTO: 1.01
UROBILINOGEN UR STRIP.AUTO-MCNC: NORMAL MG/DL

## 2024-07-24 ASSESSMENT — ENCOUNTER SYMPTOMS
LIGHT-HEADEDNESS: 0
DIZZINESS: 0
ALLERGIC/IMMUNOLOGIC NEGATIVE: 1
PALPITATIONS: 0
ENDOCRINE NEGATIVE: 1
NUMBNESS: 0
SHORTNESS OF BREATH: 0
PSYCHIATRIC NEGATIVE: 1
SINUS PAIN: 0
MUSCULOSKELETAL NEGATIVE: 1
HEMATOLOGIC/LYMPHATIC NEGATIVE: 1
RHINORRHEA: 0
ACTIVITY CHANGE: 0
CHILLS: 0
WEAKNESS: 0
DIARRHEA: 0
VOMITING: 0
NAUSEA: 0
EYES NEGATIVE: 1
CHEST TIGHTNESS: 0
SORE THROAT: 0
FEVER: 0
FATIGUE: 0
HEADACHES: 0
COUGH: 0
ABDOMINAL PAIN: 0
SINUS PRESSURE: 0
CONSTIPATION: 0
ABDOMINAL DISTENTION: 0
WHEEZING: 0

## 2024-07-24 NOTE — PROGRESS NOTES
Subjective   Patient ID: Isadora Lei is a 64 y.o. female who presents for No chief complaint on file..    Patient of Dr. FRITZ Li. This is the first time I have seen this patient.          The 10-year ASCVD risk score (Chikis DK, et al., 2019) is: 4.4%    Values used to calculate the score:      Age: 64 years      Sex: Female      Is Non- : No      Diabetic: No      Tobacco smoker: No      Systolic Blood Pressure: 112 mmHg      Is BP treated: No      HDL Cholesterol: 53.7 mg/dL      Total Cholesterol: 163 mg/dL    Lab on 05/17/2024   Component Date Value Ref Range Status    WBC 05/17/2024 7.6  4.4 - 11.3 x10*3/uL Final    nRBC 05/17/2024 0.0  0.0 - 0.0 /100 WBCs Final    RBC 05/17/2024 4.39  4.00 - 5.20 x10*6/uL Final    Hemoglobin 05/17/2024 14.0  12.0 - 16.0 g/dL Final    Hematocrit 05/17/2024 42.2  36.0 - 46.0 % Final    MCV 05/17/2024 96  80 - 100 fL Final    MCH 05/17/2024 31.9  26.0 - 34.0 pg Final    MCHC 05/17/2024 33.2  32.0 - 36.0 g/dL Final    RDW 05/17/2024 12.3  11.5 - 14.5 % Final    Platelets 05/17/2024 205  150 - 450 x10*3/uL Final    Neutrophils % 05/17/2024 44.3  40.0 - 80.0 % Final    Immature Granulocytes %, Automated 05/17/2024 0.1  0.0 - 0.9 % Final    Immature Granulocyte Count (IG) includes promyelocytes, myelocytes and metamyelocytes but does not include bands. Percent differential counts (%) should be interpreted in the context of the absolute cell counts (cells/UL).    Lymphocytes % 05/17/2024 40.6  13.0 - 44.0 % Final    Monocytes % 05/17/2024 9.1  2.0 - 10.0 % Final    Eosinophils % 05/17/2024 5.0  0.0 - 6.0 % Final    Basophils % 05/17/2024 0.9  0.0 - 2.0 % Final    Neutrophils Absolute 05/17/2024 3.36  1.20 - 7.70 x10*3/uL Final    Percent differential counts (%) should be interpreted in the context of the absolute cell counts (cells/uL).    Immature Granulocytes Absolute, Au* 05/17/2024 0.01  0.00 - 0.70 x10*3/uL Final    Lymphocytes Absolute  05/17/2024 3.08  1.20 - 4.80 x10*3/uL Final    Monocytes Absolute 05/17/2024 0.69  0.10 - 1.00 x10*3/uL Final    Eosinophils Absolute 05/17/2024 0.38  0.00 - 0.70 x10*3/uL Final    Basophils Absolute 05/17/2024 0.07  0.00 - 0.10 x10*3/uL Final    Glucose 05/17/2024 106 (H)  74 - 99 mg/dL Final    Sodium 05/17/2024 139  136 - 145 mmol/L Final    Potassium 05/17/2024 4.4  3.5 - 5.3 mmol/L Final    Chloride 05/17/2024 103  98 - 107 mmol/L Final    Bicarbonate 05/17/2024 30  21 - 32 mmol/L Final    Anion Gap 05/17/2024 10  10 - 20 mmol/L Final    Urea Nitrogen 05/17/2024 17  6 - 23 mg/dL Final    Creatinine 05/17/2024 0.82  0.50 - 1.05 mg/dL Final    eGFR 05/17/2024 79  >60 mL/min/1.73m*2 Final    Calculations of estimated GFR are performed using the 2021 CKD-EPI Study Refit equation without the race variable for the IDMS-Traceable creatinine methods.  https://jasn.asnjournals.org/content/early/2021/09/22/ASN.1185384479    Calcium 05/17/2024 9.6  8.6 - 10.3 mg/dL Final    Albumin 05/17/2024 4.2  3.4 - 5.0 g/dL Final    Alkaline Phosphatase 05/17/2024 63  33 - 136 U/L Final    Total Protein 05/17/2024 7.5  6.4 - 8.2 g/dL Final    AST 05/17/2024 22  9 - 39 U/L Final    Bilirubin, Total 05/17/2024 0.8  0.0 - 1.2 mg/dL Final    ALT 05/17/2024 13  7 - 45 U/L Final    Patients treated with Sulfasalazine may generate falsely decreased results for ALT.    Thyroid Stimulating Hormone 05/17/2024 2.91  0.44 - 3.98 mIU/L Final    Hepatitis C AB 05/17/2024 Nonreactive  Nonreactive Final    Results from patients taking biotin supplements or receiving high-dose biotin therapy should be interpreted with caution due to possible interference with this test. Providers may contact their local laboratory for further information.    Color, Urine 05/17/2024 Light-Yellow  Light-Yellow, Yellow, Dark-Yellow Final    Appearance, Urine 05/17/2024 Clear  Clear Final    Specific Gravity, Urine 05/17/2024 1.011  1.005 - 1.035 Final    pH, Urine  05/17/2024 7.5  5.0, 5.5, 6.0, 6.5, 7.0, 7.5, 8.0 Final    Protein, Urine 05/17/2024 NEGATIVE  NEGATIVE, 10 (TRACE), 20 (TRACE) mg/dL Final    Glucose, Urine 05/17/2024 Normal  Normal mg/dL Final    Blood, Urine 05/17/2024 NEGATIVE  NEGATIVE Final    Ketones, Urine 05/17/2024 NEGATIVE  NEGATIVE mg/dL Final    Bilirubin, Urine 05/17/2024 NEGATIVE  NEGATIVE Final    Urobilinogen, Urine 05/17/2024 Normal  Normal mg/dL Final    Nitrite, Urine 05/17/2024 NEGATIVE  NEGATIVE Final    Leukocyte Esterase, Urine 05/17/2024 NEGATIVE  NEGATIVE Final    Extra Tube 05/17/2024 Hold for add-ons.   Final    Auto resulted.       ECG 12 lead  Sinus rhythm no ST-T wave changes       Review of Systems   Constitutional:  Negative for activity change, chills, fatigue and fever.   HENT:  Negative for congestion, rhinorrhea, sinus pressure, sinus pain and sore throat.    Eyes: Negative.    Respiratory:  Negative for cough, chest tightness, shortness of breath and wheezing.    Cardiovascular:  Negative for chest pain, palpitations and leg swelling.   Gastrointestinal:  Negative for abdominal distention, abdominal pain, constipation, diarrhea, nausea and vomiting.   Endocrine: Negative.    Genitourinary: Negative.    Musculoskeletal: Negative.    Skin: Negative.    Allergic/Immunologic: Negative.    Neurological:  Negative for dizziness, syncope, weakness, light-headedness, numbness and headaches.   Hematological: Negative.    Psychiatric/Behavioral: Negative.     All other systems reviewed and are negative.      Objective   Visit Vitals  Smoking Status Never      Physical Exam  Vitals and nursing note reviewed.   Constitutional:       General: She is not in acute distress.     Appearance: Normal appearance. She is not ill-appearing.   HENT:      Head: Normocephalic and atraumatic.      Right Ear: Tympanic membrane, ear canal and external ear normal.      Left Ear: Tympanic membrane, ear canal and external ear normal.      Nose: Nose normal.       Mouth/Throat:      Mouth: Mucous membranes are moist.      Pharynx: Oropharynx is clear.   Eyes:      Pupils: Pupils are equal, round, and reactive to light.   Cardiovascular:      Rate and Rhythm: Normal rate and regular rhythm.      Pulses: Normal pulses.      Heart sounds: Normal heart sounds. No murmur heard.  Pulmonary:      Effort: Pulmonary effort is normal. No respiratory distress.      Breath sounds: Normal breath sounds. No wheezing.   Abdominal:      General: Bowel sounds are normal. There is no distension.      Palpations: Abdomen is soft.      Tenderness: There is no abdominal tenderness.   Musculoskeletal:         General: No tenderness. Normal range of motion.      Cervical back: Normal range of motion and neck supple.      Right lower leg: No edema.      Left lower leg: No edema.   Skin:     General: Skin is warm and dry.      Capillary Refill: Capillary refill takes less than 2 seconds.      Coloration: Skin is not jaundiced.   Neurological:      General: No focal deficit present.      Mental Status: She is alert and oriented to person, place, and time.      Motor: No weakness.   Psychiatric:         Mood and Affect: Mood normal.         Behavior: Behavior normal.         Thought Content: Thought content normal.         Judgment: Judgment normal.         Assessment/Plan   Diagnoses and all orders for this visit:  Urinary symptom or sign  -     Urinalysis with Reflex Culture and Microscopic; Future

## 2024-07-25 ENCOUNTER — TELEPHONE (OUTPATIENT)
Dept: PRIMARY CARE | Facility: CLINIC | Age: 64
End: 2024-07-25
Payer: MEDICARE

## 2024-10-18 ENCOUNTER — OFFICE VISIT (OUTPATIENT)
Dept: SURGERY | Facility: CLINIC | Age: 64
End: 2024-10-18
Payer: MEDICARE

## 2024-10-18 VITALS
BODY MASS INDEX: 20.2 KG/M2 | WEIGHT: 129 LBS | HEART RATE: 76 BPM | SYSTOLIC BLOOD PRESSURE: 125 MMHG | DIASTOLIC BLOOD PRESSURE: 81 MMHG

## 2024-10-18 DIAGNOSIS — K51.411: Primary | ICD-10-CM

## 2024-10-18 DIAGNOSIS — K62.89 RECTAL INFLAMMATION: ICD-10-CM

## 2024-10-18 PROCEDURE — 3079F DIAST BP 80-89 MM HG: CPT | Performed by: NURSE PRACTITIONER

## 2024-10-18 PROCEDURE — 3074F SYST BP LT 130 MM HG: CPT | Performed by: NURSE PRACTITIONER

## 2024-10-18 PROCEDURE — 99213 OFFICE O/P EST LOW 20 MIN: CPT | Performed by: NURSE PRACTITIONER

## 2024-10-18 PROCEDURE — 46600 DIAGNOSTIC ANOSCOPY SPX: CPT | Mod: MUE | Performed by: NURSE PRACTITIONER

## 2024-10-18 PROCEDURE — 46600 DIAGNOSTIC ANOSCOPY SPX: CPT | Performed by: NURSE PRACTITIONER

## 2024-10-18 NOTE — PROGRESS NOTES
History Of Present Illness  Isadora Lei is a 64 y.o. female presenting with anal soreness.    On July 19th, she had a hemorrhoidal ligation by Dr. Jenkins at [facility].    She feels like she has a sore on her anus 6 weeks after the surgery.  No c/o any rectal bleeding.  She has soreness almost every day.  She will have 5-6 soft-loose BM's every day and it has been like that for the past 3 years.  She normally would have 1 BM every day.  She is not taking any fiber supplements to help bulk up her stools.    Colonoscopy 2017 negative    Past Medical History  She has a past medical history of Encounter for general adult medical examination without abnormal findings, Fissure, anal, and PONV (postoperative nausea and vomiting).    Surgical History  She has a past surgical history that includes Other surgical history (07/02/2020); Other surgical history (07/02/2020); Adenoidectomy; Vein ligation and stripping; Rhinoplasty; Cholecystectomy; Cosmetic surgery; Sinus surgery; Tonsillectomy; and [place] tooth extraction.     Social History  She reports that she has never smoked. She has never used smokeless tobacco. She reports that she does not drink alcohol and does not use drugs.    Family History  Family History   Problem Relation Name Age of Onset    Heart disease Mother Doris     Other (cardiac disease) Mother Doris     Arthritis Mother Doris     COPD Mother Doris     Hearing loss Mother Doris     Hypertension Mother Doris     Miscarriages / Stillbirths Mother Doris     Stroke Mother Doris     Vision loss Mother Doris     Diabetes Father Regan     Heart disease Father Regan     Skin cancer Father Regan     Kidney failure Father Regan     Arthritis Father Regan     Asthma Father Regan     Cancer Father Regan     Hearing loss Father Regan     Hyperlipidemia Father Regan     Hypertension Father Regan     Kidney disease Father Regan     Stroke Father Regan     Vision loss Father Regan         Allergies  Fluconazole    Review of Systems   All other systems  reviewed and are negative.       Physical Exam  Constitutional:       Appearance: Normal appearance.   HENT:      Head: Normocephalic and atraumatic.   Pulmonary:      Effort: Pulmonary effort is normal.   Musculoskeletal:         General: Normal range of motion.   Skin:     General: Skin is warm and dry.   Neurological:      General: No focal deficit present.      Mental Status: She is alert and oriented to person, place, and time.   Psychiatric:         Mood and Affect: Mood normal.         Behavior: Behavior normal.         Anoscopy    Date/Time: 10/18/2024 9:28 AM    Performed by: DONTA Botello  Authorized by: DONTA Botello    Consent:     Consent obtained:  Verbal    Consent given by:  Patient    Risks, benefits, and alternatives were discussed: yes    Universal protocol:     Procedure explained and questions answered to patient or proxy's satisfaction: yes      Patient identity confirmed:  Verbally with patient  Post-procedure details:     Procedure completion:  Tolerated  Comments:      No external hemorrhoids.  Weak tone on ALESSANDRA.  No pain.  On anoscopy, looking in 360 degrees, she has inflamed rectal high in the rectal vault with using the long anoscope.  No active bleeding or pain.    Last Recorded Vitals  /81   Pulse 76   Wt 58.5 kg (129 lb)      Assessment/Plan   Isadora has inflamed rectal tissue and a weak anal tone.  She will schedule to have a colonoscopy for further evaluation of the inflamed rectal tissue.  She will start using hydrocortisone suppositories BID for 2 weeks.  She will continue to increase her fiber and water intake to keep her stools soft and start taking a fiber supplement to help bulk up her stools.  She will call with any issues and will follow up after the colonoscopy.       DONTA Botello

## 2024-10-23 ENCOUNTER — ANESTHESIA EVENT (OUTPATIENT)
Dept: OPERATING ROOM | Facility: HOSPITAL | Age: 64
End: 2024-10-23
Payer: MEDICARE

## 2024-10-23 RX ORDER — DROPERIDOL 2.5 MG/ML
0.62 INJECTION, SOLUTION INTRAMUSCULAR; INTRAVENOUS ONCE AS NEEDED
Status: CANCELLED | OUTPATIENT
Start: 2024-10-23

## 2024-10-23 RX ORDER — ONDANSETRON HYDROCHLORIDE 2 MG/ML
4 INJECTION, SOLUTION INTRAVENOUS ONCE AS NEEDED
Status: CANCELLED | OUTPATIENT
Start: 2024-10-23

## 2024-10-24 ENCOUNTER — ANESTHESIA (OUTPATIENT)
Dept: OPERATING ROOM | Facility: HOSPITAL | Age: 64
End: 2024-10-24
Payer: MEDICARE

## 2024-10-24 ENCOUNTER — HOSPITAL ENCOUNTER (OUTPATIENT)
Dept: OPERATING ROOM | Facility: HOSPITAL | Age: 64
Setting detail: OUTPATIENT SURGERY
Discharge: HOME | End: 2024-10-24
Payer: MEDICARE

## 2024-10-24 VITALS
RESPIRATION RATE: 15 BRPM | SYSTOLIC BLOOD PRESSURE: 119 MMHG | HEIGHT: 67 IN | TEMPERATURE: 97.9 F | WEIGHT: 125.66 LBS | HEART RATE: 78 BPM | BODY MASS INDEX: 19.72 KG/M2 | OXYGEN SATURATION: 98 % | DIASTOLIC BLOOD PRESSURE: 71 MMHG

## 2024-10-24 DIAGNOSIS — K62.89 RECTAL INFLAMMATION: Primary | ICD-10-CM

## 2024-10-24 PROCEDURE — G0121 COLON CA SCRN NOT HI RSK IND: HCPCS | Performed by: SURGERY

## 2024-10-24 PROCEDURE — 2500000004 HC RX 250 GENERAL PHARMACY W/ HCPCS (ALT 636 FOR OP/ED): Performed by: NURSE ANESTHETIST, CERTIFIED REGISTERED

## 2024-10-24 PROCEDURE — 3700000001 HC GENERAL ANESTHESIA TIME - INITIAL BASE CHARGE: Performed by: ANESTHESIOLOGY

## 2024-10-24 PROCEDURE — 7100000009 HC PHASE TWO TIME - INITIAL BASE CHARGE: Performed by: ANESTHESIOLOGY

## 2024-10-24 PROCEDURE — 7100000010 HC PHASE TWO TIME - EACH INCREMENTAL 1 MINUTE: Performed by: ANESTHESIOLOGY

## 2024-10-24 PROCEDURE — 3700000002 HC GENERAL ANESTHESIA TIME - EACH INCREMENTAL 1 MINUTE: Performed by: ANESTHESIOLOGY

## 2024-10-24 PROCEDURE — 3600000007 HC OR TIME - EACH INCREMENTAL 1 MINUTE - PROCEDURE LEVEL TWO: Performed by: ANESTHESIOLOGY

## 2024-10-24 PROCEDURE — 3600000002 HC OR TIME - INITIAL BASE CHARGE - PROCEDURE LEVEL TWO: Performed by: ANESTHESIOLOGY

## 2024-10-24 RX ORDER — SODIUM CHLORIDE 9 MG/ML
INJECTION, SOLUTION INTRAVENOUS CONTINUOUS PRN
Status: DISCONTINUED | OUTPATIENT
Start: 2024-10-24 | End: 2024-10-24

## 2024-10-24 RX ORDER — PROPOFOL 10 MG/ML
INJECTION, EMULSION INTRAVENOUS AS NEEDED
Status: DISCONTINUED | OUTPATIENT
Start: 2024-10-24 | End: 2024-10-24

## 2024-10-24 RX ORDER — ONDANSETRON HYDROCHLORIDE 2 MG/ML
INJECTION, SOLUTION INTRAVENOUS AS NEEDED
Status: DISCONTINUED | OUTPATIENT
Start: 2024-10-24 | End: 2024-10-24

## 2024-10-24 RX ORDER — HYDROCORTISONE 10 MG/G
CREAM TOPICAL 2 TIMES DAILY
Qty: 100 G | Refills: 3 | Status: SHIPPED | OUTPATIENT
Start: 2024-10-24 | End: 2024-11-23

## 2024-10-24 RX ORDER — LIDOCAINE 40 MG/G
CREAM TOPICAL EVERY 6 HOURS PRN
Qty: 15 G | Refills: 3 | Status: SHIPPED | OUTPATIENT
Start: 2024-10-24

## 2024-10-24 SDOH — HEALTH STABILITY: MENTAL HEALTH: CURRENT SMOKER: 0

## 2024-10-24 ASSESSMENT — PAIN - FUNCTIONAL ASSESSMENT
PAIN_FUNCTIONAL_ASSESSMENT: 0-10
PAIN_FUNCTIONAL_ASSESSMENT: 0-10

## 2024-10-24 ASSESSMENT — COLUMBIA-SUICIDE SEVERITY RATING SCALE - C-SSRS
1. IN THE PAST MONTH, HAVE YOU WISHED YOU WERE DEAD OR WISHED YOU COULD GO TO SLEEP AND NOT WAKE UP?: NO
2. HAVE YOU ACTUALLY HAD ANY THOUGHTS OF KILLING YOURSELF?: NO
6. HAVE YOU EVER DONE ANYTHING, STARTED TO DO ANYTHING, OR PREPARED TO DO ANYTHING TO END YOUR LIFE?: NO

## 2024-10-24 ASSESSMENT — PAIN SCALES - GENERAL
PAIN_LEVEL: 2
PAINLEVEL_OUTOF10: 0 - NO PAIN
PAINLEVEL_OUTOF10: 0 - NO PAIN
PAINLEVEL_OUTOF10: 4

## 2024-10-24 ASSESSMENT — PAIN DESCRIPTION - DESCRIPTORS: DESCRIPTORS: BURNING

## 2024-10-24 NOTE — DISCHARGE INSTRUCTIONS
Patient Instructions after a Colonoscopy      The anesthetics, sedatives or narcotics which were given to you today will be acting in your body for the next 24 hours, so you might feel a little sleepy or groggy.  This feeling should slowly wear off. Carefully read and follow the instructions.     You received sedation today:  - Do not drive or operate any machinery or power tools of any kind.   - No alcoholic beverages today, not even beer or wine.  - Do not make any important decisions or sign any legal documents.  - No over the counter medications that contain alcohol or that may cause drowsiness.  - Do not make any important decisions or sign any legal documents.  - Make sure you have someone with you for first 24 hours.    While it is common to experience mild to moderate abdominal distention, gas, or belching after your procedure, if any of these symptoms occur following discharge from the GI Lab or within one week of having your procedure, call the [facility] to be advised whether a visit to your nearest Urgent Care or Emergency Department is indicated.  Take this paper with you if you go.     - If you develop an allergic reaction to the medications that were given during your procedure such as difficulty breathing, rash, hives, severe nausea, vomiting or lightheadedness.  - If you experience chest pain, shortness of breath, severe abdominal pain, fevers and chills.  -If you develop signs and symptoms of bleeding such as blood in your spit, if your stools turn black, tarry, or bloody  - If you have not urinated within 8 hours following your procedure.  - If your IV site becomes painful, red, inflamed, or looks infected.    If you received a biopsy/polypectomy/sphincterotomy the following instructions apply below:    __ Do not use Aspirin containing products, non-steroidal medications or anti-coagulants for one week following your procedure. (Examples of these types of medications are: Advil,  Arthrotec, Aleve, Coumadin, Ecotrin, Heparin, Ibuprofen, Indocin, Motrin, Naprosyn, Nuprin, Plavix, Vioxx, and Voltarin, or their generic forms.  This list is not all-inclusive.  Check with your physician or pharmacist before resuming medications.)   __ Eat a soft diet today.  Avoid foods that are poorly digested for the next 24 hours.  These foods would include: nuts, beans, lettuce, red meats, and fried foods. Start with liquids and advance your diet as tolerated, gradually work up to eating solids.   __ Do not have a Barium Study or Enema for one week.    Your physician recommends the additional following instructions:    -You have a contact number available for emergencies. The signs and symptoms of potential delayed complications were discussed with you. You may return to normal activities tomorrow.  -Resume your previous diet.  -Continue your present medications.   -We are waiting for your pathology results.  -Your physician has recommended a repeat colonoscopy (date to be determined after pending pathology results are reviewed) for surveillance based on pathology results.  -The findings and recommendations have been discussed with you.  -The findings and recommendations were discussed with your family.  - Please see Medication Reconciliation Form for new medication/medications prescribed.       If you experience any problems or have any questions following discharge from the GI Lab, please call:        Nurse Signature                                                                        Date___________________                                                                            Patient/Responsible Party Signature                                        Date___________________

## 2024-10-24 NOTE — H&P
General Surgery History and Physical    Referring Provider:  MD Juanito De Donya M, ZOLTAN*     Chief Complaint:  Colonoscopy    History of Present Illness:  This is a 64 y.o. female who presents for a colonoscopy.    Past Medical History:  Past Medical History:   Diagnosis Date    DDD (degenerative disc disease), lumbar     Encounter for general adult medical examination without abnormal findings     Encounter for preventive health examination    Fissure, anal     HTN (hypertension)     PONV (postoperative nausea and vomiting)     Rectal inflammation 10/24/2024        Past Surgical History:  Past Surgical History:   Procedure Laterality Date    ADENOIDECTOMY      CHOLECYSTECTOMY      COSMETIC SURGERY      HEMORRHOID SURGERY  09/17/2024    RHINOPLASTY      SINUS SURGERY      TONSILLECTOMY      VEIN LIGATION AND STRIPPING      WISDOM TOOTH EXTRACTION          Medications:  Current Outpatient Medications   Medication Instructions    multivitamin tablet 1 tablet, Daily    nystatin (Mycostatin) ointment 1 Application, Topical, 2 times daily,  APPLY SPARINGLY TO AFFECTED AREA(S) 2 TIMES DAILY.        Allergies:  Allergies   Allergen Reactions    Fluconazole Swelling     Swelling of the tongue        Family History:  Family History   Problem Relation Name Age of Onset    Heart disease Mother Doris     Other (cardiac disease) Mother Doirs     Arthritis Mother Doris     COPD Mother Doris     Hearing loss Mother Doris     Hypertension Mother Doris     Miscarriages / Stillbirths Mother Doris     Stroke Mother Doris     Vision loss Mother Doris     Diabetes Father Regan     Heart disease Father Regan     Skin cancer Father Regan     Kidney failure Father Regan     Arthritis Father Regan     Asthma Father Regan     Cancer Father Regan     Hearing loss Father Regan     Hyperlipidemia Father Regan     Hypertension Father Regan     Kidney disease Father Regan     Stroke Father Regan     Vision loss Father Regan         Social History:  Social  "History     Socioeconomic History    Marital status:    Occupational History    Occupation: retired   Tobacco Use    Smoking status: Never    Smokeless tobacco: Never   Substance and Sexual Activity    Alcohol use: Never    Drug use: Never    Sexual activity: Yes     Partners: Male     Birth control/protection: Post-menopausal        Review of Systems:  A complete 12 point review of systems was performed and is negative except as noted in the history of present illness.      Vital Signs:  Vitals:    10/24/24 0634   BP: 130/72   Pulse: 93   Temp: 36 °C (96.8 °F)   SpO2: 94%          Physical Exam:  General: No acute distress. Sitting up in bed.   Neuro: Alert and oriented ×3. Follows commands.  Head: Atraumatic  Eyes: Pupils equal reactive to light. Extraocular motions intact.  Ears: Hears normal speaking voice.  Mouth, Nose, Throat: Mucous membranes moist.  Normal dentition.  Neck: Supple. No appreciable masses.  Chest: No crepitus.    Heart: Regular rate and rhythm.  Lung: Clear to auscultation bilaterally.  Vascular: No carotid bruits.  Palpable radial pulses bilaterally.  Abdomen: Soft. Nondistended. Nontender.    Musculoskeletal: Moves all extremities.  Normal range of motion.  Lymphatic: No palpable lymph nodes.  Skin: No rashes or lesions.  Psychological: Normal affect      Laboratory Values:  CBC: No results found for: \"WBC\", \"RBC\", \"HGB\", \"HCT\", \"PLT\"    RFP: No results found for: \"GLUF\", \"NA\", \"K\", \"CL\", \"CO2\", \"BUN\", \"CREATININE\", \"CALCIUM\", \"MG\", \"PHOS\"     LFTs: No results found for: \"PROT\", \"ALBUMIN\", \"BILITOT\", \"BILIDIR\", \"ALKPHOS\", \"AST\", \"ALT\"         Assessment:  This is a 64 y.o. female who presents for a colonoscopy.      Plan:  -- Colonoscopy.      Jevon Donaldson MD  General Surgery  Office: [phone]  Fax:     [phone]  7:43 AM   10/24/24     "

## 2024-10-24 NOTE — ANESTHESIA POSTPROCEDURE EVALUATION
Patient: Isadora Lei    Procedure Summary       Date: 10/24/24 Room / Location: [facility] OR    Anesthesia Start: 0751 Anesthesia Stop: 0818    Procedure: COLONOSCOPY Diagnosis: Rectal inflammation    Scheduled Providers: Riccardo Donaldson MD; Justus Woods MD Responsible Provider: Justus Woods MD    Anesthesia Type: MAC ASA Status: 2            Anesthesia Type: MAC    Vitals Value Taken Time   /71 10/24/24 0831   Temp 36.6 °C (97.9 °F) 10/24/24 0816   Pulse 78 10/24/24 0831   Resp 15 10/24/24 0831   SpO2 98 % 10/24/24 0831       Anesthesia Post Evaluation    Patient location during evaluation: PACU  Patient participation: complete - patient participated  Level of consciousness: awake  Pain score: 2  Pain management: adequate  Multimodal analgesia pain management approach  Airway patency: patent  Two or more strategies used to mitigate risk of obstructive sleep apnea  Cardiovascular status: acceptable  Respiratory status: acceptable  Hydration status: acceptable  Postoperative Nausea and Vomiting: none    No notable events documented.

## 2024-10-24 NOTE — ANESTHESIA PREPROCEDURE EVALUATION
Patient: Isadora Lei    Procedure Information       Anesthesia Start Date/Time: 10/24/24 0751    Scheduled providers: Riccardo Donaldson MD; Justus Woods MD    Procedure: COLONOSCOPY    Location: [facility] OR            Relevant Problems   Cardiac   (+) Benign essential HTN      /Renal   (+) Recurrent UTI      Musculoskeletal   (+) DDD (degenerative disc disease), lumbar      ID   (+) History of MRSA infection   (+) Onychomycosis of toenail   (+) Recurrent UTI      Skin   (+) Rash       Clinical information reviewed:    Allergies  Meds   Med Hx  Surg Hx  OB Status           NPO Detail:  NPO/Void Status  Carbohydrate Drink Given Prior to Surgery? : N  Date of Last Liquid: 10/24/24  Time of Last Liquid: 0000  Date of Last Solid: 10/22/24  Time of Last Solid: 1200  Last Intake Type: GI prep  Time of Last Void: 0630         Physical Exam    Airway  Mallampati: I  TM distance: >3 FB  Neck ROM: full     Cardiovascular - normal exam     Dental    Pulmonary - normal exam     Abdominal - normal exam         Anesthesia Plan    History of general anesthesia?: yes  History of complications of general anesthesia?: no    ASA 2     MAC     The patient is not a current smoker.    intravenous induction   Anesthetic plan and risks discussed with patient.  Use of blood products discussed with patient who.    Plan discussed with CRNA and attending.

## 2024-12-23 ENCOUNTER — OFFICE VISIT (OUTPATIENT)
Dept: URGENT CARE | Facility: URGENT CARE | Age: 64
End: 2024-12-23
Payer: MEDICARE

## 2024-12-23 VITALS
DIASTOLIC BLOOD PRESSURE: 78 MMHG | BODY MASS INDEX: 20.06 KG/M2 | RESPIRATION RATE: 18 BRPM | SYSTOLIC BLOOD PRESSURE: 123 MMHG | HEART RATE: 72 BPM | OXYGEN SATURATION: 98 % | TEMPERATURE: 97.8 F | WEIGHT: 128.09 LBS

## 2024-12-23 DIAGNOSIS — K62.89: Primary | ICD-10-CM

## 2024-12-23 PROCEDURE — 1125F AMNT PAIN NOTED PAIN PRSNT: CPT | Performed by: FAMILY MEDICINE

## 2024-12-23 PROCEDURE — 3074F SYST BP LT 130 MM HG: CPT | Performed by: FAMILY MEDICINE

## 2024-12-23 PROCEDURE — 3078F DIAST BP <80 MM HG: CPT | Performed by: FAMILY MEDICINE

## 2024-12-23 PROCEDURE — 99203 OFFICE O/P NEW LOW 30 MIN: CPT | Performed by: FAMILY MEDICINE

## 2024-12-23 PROCEDURE — 1160F RVW MEDS BY RX/DR IN RCRD: CPT | Performed by: FAMILY MEDICINE

## 2024-12-23 PROCEDURE — 1159F MED LIST DOCD IN RCRD: CPT | Performed by: FAMILY MEDICINE

## 2024-12-23 RX ORDER — CIPROFLOXACIN 500 MG/1
500 TABLET ORAL 2 TIMES DAILY
Qty: 14 TABLET | Refills: 0 | Status: SHIPPED | OUTPATIENT
Start: 2024-12-23 | End: 2024-12-30

## 2024-12-23 ASSESSMENT — PAIN SCALES - GENERAL: PAINLEVEL_OUTOF10: 2

## 2024-12-23 NOTE — PROGRESS NOTES
Subjective   Patient ID: Isadora Lei is a 64 y.o. female.    HPI: 64-year-old female presents with a complaint of continued pain and lesion at the site of anal fistula repair that was performed in July.  She states she continues to note discharge but she does not have any bleeding.  Rates her pain as 2 out of 10 but admits to taking tramadol at 9 AM this morning.  She is a non-smoker.      History provided by:  Patient   used: No        The following portions of the chart were reviewed this encounter and updated as appropriate:  Tobacco  Allergies  Meds  Problems  Med Hx  Surg Hx  Fam Hx         Review of Systems   Constitutional:  Negative for chills and fever.   HENT:  Negative for congestion, ear pain, rhinorrhea, sinus pressure and sore throat.    Respiratory:  Negative for cough, chest tightness, shortness of breath and wheezing.    Cardiovascular:  Negative for palpitations.   Gastrointestinal:  Positive for anal bleeding (Excess mucus drainage) and rectal pain. Negative for abdominal pain, constipation, diarrhea, nausea and vomiting.   Genitourinary:  Negative for dysuria and frequency.   Neurological:  Negative for headaches.     Objective   Physical Exam  Vital signs are reviewed. Alert and oriented x3 with normal mood and affect  Patient is well nourished, well-developed, alert and in no acute distress    External eyes, orbits, conjunctiva and eyelids are normal in appearance  Pupils are equal, round, reactive to light and accommodation, extraocular movements intact    External ears appear normal  External canals are normal in appearance  Right tympanic membrane is intact and pale gray in appearance  Left tympanic membrane is intact and pale gray in appearance  There is no middle ear effusion noted on the right  There is no middle ear effusion noted on the left  External appearance of the nose is normal  Nasal mucosa, septum, turbinates are pink in appearance  There is no  nasal discharge in both nares    Oral mucosa is uniformly pink and moist  Palate is pink, symmetric and intact  Tongue is moist, mobile and midline  Posterior pharynx not erythematous with no concretions or exudates present  No cervical lymphadenopathy palpated    Heart has regular rate and rhythm. No murmurs, rubs or gallops are auscultated at this exam.    Respiratory rate rhythm and effort are normal. Breath sounds bilaterally are clear on auscultation without crackles, rhonchi, wheezes or friction rub.    Abdomen: Normal bowel sounds on auscultation. Soft, nontender without rebound or rigidity on palpation    Rectal: PSR chaperone for entire exam with patient's consent. Anal cleft free of any lesions. No anal fissure or other abnormality of anus noted. Normal sphincter tone. Single digit inserted and exam or internal hemorrhoidal area is guided by patient and lesion of right lateral rectal wall palpated. No bleeding observes and no bloody discharge on exam glove. Patient tolerated well.  Procedures    Assessment/Plan   Diagnoses and all orders for this visit:  Infection of perianal area  -     ciprofloxacin (Cipro) 500 mg tablet; Take 1 tablet (500 mg) by mouth 2 times a day for 7 days.    Patient disposition: Home

## 2024-12-23 NOTE — PATIENT INSTRUCTIONS
You have painful perirectal wound.  Please increase your oral fluids for the next 7-10 days  Please take ciprofloxacin as prescribed.  Do not take this at the same time you are taking vitamin supplements or using any dairy products.  May use tramadol as needed for pain  Please follow-up with colorectal surgery.  A referral be given to you for this purpose  May take ibuprofen 200mg, 2 tablets by mouth every 8 hours with food for discomfort.  May use Tylenol 325 mg, one or 2 tablets by mouth every 4-6 hours as needed for additional pain relief  May do warm soaks to the area if this gives additional relief.  If fever greater than 102 degrees F, chills, nausea, vomiting, increased redness, increased pain, induration, drainage, crusting, purulent discharge please go to emergency department for further evaluation of this problem  This note was generated by voice recognition software. Minor transcription/grammatical errors may be present. Please call for clarification.

## 2025-01-08 ASSESSMENT — ENCOUNTER SYMPTOMS
WHEEZING: 0
CHILLS: 0
ANAL BLEEDING: 1
SORE THROAT: 0
FEVER: 0
DIARRHEA: 0
RHINORRHEA: 0
SINUS PRESSURE: 0
COUGH: 0
PALPITATIONS: 0
VOMITING: 0
CHEST TIGHTNESS: 0
ABDOMINAL PAIN: 0
FREQUENCY: 0
SHORTNESS OF BREATH: 0
CONSTIPATION: 0
RECTAL PAIN: 1
HEADACHES: 0
NAUSEA: 0
DYSURIA: 0

## 2025-07-09 ENCOUNTER — APPOINTMENT (OUTPATIENT)
Dept: PRIMARY CARE | Facility: CLINIC | Age: 65
End: 2025-07-09
Payer: MEDICARE